# Patient Record
Sex: MALE | Race: BLACK OR AFRICAN AMERICAN | Employment: OTHER | URBAN - METROPOLITAN AREA
[De-identification: names, ages, dates, MRNs, and addresses within clinical notes are randomized per-mention and may not be internally consistent; named-entity substitution may affect disease eponyms.]

---

## 2022-09-09 ENCOUNTER — HOSPITAL ENCOUNTER (EMERGENCY)
Age: 72
Discharge: HOME | End: 2022-09-09
Attending: EMERGENCY MEDICINE
Payer: MEDICARE

## 2022-09-09 ENCOUNTER — APPOINTMENT (OUTPATIENT)
Dept: GENERAL RADIOLOGY | Age: 72
End: 2022-09-09
Attending: EMERGENCY MEDICINE
Payer: MEDICARE

## 2022-09-09 ENCOUNTER — APPOINTMENT (OUTPATIENT)
Dept: CT IMAGING | Age: 72
End: 2022-09-09
Attending: EMERGENCY MEDICINE
Payer: MEDICARE

## 2022-09-09 VITALS
SYSTOLIC BLOOD PRESSURE: 154 MMHG | HEIGHT: 74 IN | WEIGHT: 215 LBS | DIASTOLIC BLOOD PRESSURE: 87 MMHG | BODY MASS INDEX: 27.59 KG/M2 | TEMPERATURE: 98.5 F | HEART RATE: 91 BPM | OXYGEN SATURATION: 95 % | RESPIRATION RATE: 29 BRPM

## 2022-09-09 DIAGNOSIS — J18.9 COMMUNITY ACQUIRED PNEUMONIA OF RIGHT LOWER LOBE OF LUNG: ICD-10-CM

## 2022-09-09 DIAGNOSIS — M10.062 ACUTE IDIOPATHIC GOUT OF LEFT KNEE: Primary | ICD-10-CM

## 2022-09-09 LAB
ALBUMIN SERPL-MCNC: 2.9 G/DL (ref 3.5–5)
ALBUMIN/GLOB SERPL: 0.5 {RATIO} (ref 1.1–2.2)
ALP SERPL-CCNC: 97 U/L (ref 45–117)
ALT SERPL-CCNC: 36 U/L (ref 12–78)
ANION GAP SERPL CALC-SCNC: 9 MMOL/L (ref 5–15)
AST SERPL-CCNC: 34 U/L (ref 15–37)
ATRIAL RATE: 95 BPM
BASOPHILS # BLD: 0 K/UL (ref 0–0.1)
BASOPHILS NFR BLD: 0 % (ref 0–1)
BILIRUB SERPL-MCNC: 0.4 MG/DL (ref 0.2–1)
BNP SERPL-MCNC: 154 PG/ML (ref 0–125)
BUN SERPL-MCNC: 17 MG/DL (ref 6–20)
BUN/CREAT SERPL: 14 (ref 12–20)
CALCIUM SERPL-MCNC: 9.5 MG/DL (ref 8.5–10.1)
CALCULATED P AXIS, ECG09: 28 DEGREES
CALCULATED R AXIS, ECG10: -31 DEGREES
CALCULATED T AXIS, ECG11: 37 DEGREES
CHLORIDE SERPL-SCNC: 98 MMOL/L (ref 97–108)
CO2 SERPL-SCNC: 27 MMOL/L (ref 21–32)
CREAT SERPL-MCNC: 1.23 MG/DL (ref 0.7–1.3)
CRP SERPL-MCNC: 12.79 MG/DL (ref 0–0.6)
DIAGNOSIS, 93000: NORMAL
DIFFERENTIAL METHOD BLD: NORMAL
EOSINOPHIL # BLD: 0.1 K/UL (ref 0–0.4)
EOSINOPHIL NFR BLD: 1 % (ref 0–7)
ERYTHROCYTE [DISTWIDTH] IN BLOOD BY AUTOMATED COUNT: 12.3 % (ref 11.5–14.5)
ERYTHROCYTE [SEDIMENTATION RATE] IN BLOOD: 76 MM/HR (ref 0–20)
GLOBULIN SER CALC-MCNC: 5.9 G/DL (ref 2–4)
GLUCOSE SERPL-MCNC: 111 MG/DL (ref 65–100)
HCT VFR BLD AUTO: 40.6 % (ref 36.6–50.3)
HGB BLD-MCNC: 13.3 G/DL (ref 12.1–17)
IMM GRANULOCYTES # BLD AUTO: 0 K/UL (ref 0–0.04)
IMM GRANULOCYTES NFR BLD AUTO: 0 % (ref 0–0.5)
LYMPHOCYTES # BLD: 1.5 K/UL (ref 0.8–3.5)
LYMPHOCYTES NFR BLD: 17 % (ref 12–49)
MCH RBC QN AUTO: 28.1 PG (ref 26–34)
MCHC RBC AUTO-ENTMCNC: 32.8 G/DL (ref 30–36.5)
MCV RBC AUTO: 85.8 FL (ref 80–99)
MONOCYTES # BLD: 0.8 K/UL (ref 0–1)
MONOCYTES NFR BLD: 9 % (ref 5–13)
NEUTS SEG # BLD: 6.3 K/UL (ref 1.8–8)
NEUTS SEG NFR BLD: 72 % (ref 32–75)
NRBC # BLD: 0 K/UL (ref 0–0.01)
NRBC BLD-RTO: 0 PER 100 WBC
P-R INTERVAL, ECG05: 150 MS
PLATELET # BLD AUTO: 259 K/UL (ref 150–400)
PMV BLD AUTO: 10.1 FL (ref 8.9–12.9)
POTASSIUM SERPL-SCNC: 4.3 MMOL/L (ref 3.5–5.1)
PROT SERPL-MCNC: 8.8 G/DL (ref 6.4–8.2)
Q-T INTERVAL, ECG07: 350 MS
QRS DURATION, ECG06: 110 MS
QTC CALCULATION (BEZET), ECG08: 439 MS
RBC # BLD AUTO: 4.73 M/UL (ref 4.1–5.7)
SODIUM SERPL-SCNC: 134 MMOL/L (ref 136–145)
TROPONIN-HIGH SENSITIVITY: 6 NG/L (ref 0–76)
VENTRICULAR RATE, ECG03: 95 BPM
WBC # BLD AUTO: 8.8 K/UL (ref 4.1–11.1)

## 2022-09-09 PROCEDURE — 99285 EMERGENCY DEPT VISIT HI MDM: CPT

## 2022-09-09 PROCEDURE — 85652 RBC SED RATE AUTOMATED: CPT

## 2022-09-09 PROCEDURE — 80053 COMPREHEN METABOLIC PANEL: CPT

## 2022-09-09 PROCEDURE — 86140 C-REACTIVE PROTEIN: CPT

## 2022-09-09 PROCEDURE — 71046 X-RAY EXAM CHEST 2 VIEWS: CPT

## 2022-09-09 PROCEDURE — 93005 ELECTROCARDIOGRAM TRACING: CPT

## 2022-09-09 PROCEDURE — 36415 COLL VENOUS BLD VENIPUNCTURE: CPT

## 2022-09-09 PROCEDURE — 74011000636 HC RX REV CODE- 636: Performed by: EMERGENCY MEDICINE

## 2022-09-09 PROCEDURE — 74011250637 HC RX REV CODE- 250/637: Performed by: EMERGENCY MEDICINE

## 2022-09-09 PROCEDURE — 83880 ASSAY OF NATRIURETIC PEPTIDE: CPT

## 2022-09-09 PROCEDURE — 84484 ASSAY OF TROPONIN QUANT: CPT

## 2022-09-09 PROCEDURE — 85025 COMPLETE CBC W/AUTO DIFF WBC: CPT

## 2022-09-09 PROCEDURE — 71275 CT ANGIOGRAPHY CHEST: CPT

## 2022-09-09 RX ORDER — AMLODIPINE BESYLATE 10 MG/1
10 TABLET ORAL DAILY
COMMUNITY

## 2022-09-09 RX ORDER — COLCHICINE 0.6 MG/1
0.6 TABLET ORAL
Status: COMPLETED | OUTPATIENT
Start: 2022-09-09 | End: 2022-09-09

## 2022-09-09 RX ORDER — PROBENECID AND COLCHICINE 500; .5 MG/1; MG/1
1 TABLET ORAL DAILY
Qty: 10 TABLET | Refills: 0 | Status: SHIPPED | OUTPATIENT
Start: 2022-09-09

## 2022-09-09 RX ORDER — DOXYCYCLINE 100 MG/1
100 CAPSULE ORAL 2 TIMES DAILY
COMMUNITY

## 2022-09-09 RX ORDER — AMOXICILLIN AND CLAVULANATE POTASSIUM 875; 125 MG/1; MG/1
1 TABLET, FILM COATED ORAL DAILY
COMMUNITY

## 2022-09-09 RX ORDER — PROBENECID AND COLCHICINE 500; .5 MG/1; MG/1
1 TABLET ORAL DAILY
Qty: 10 TABLET | Refills: 0 | Status: SHIPPED | OUTPATIENT
Start: 2022-09-09 | End: 2022-09-09

## 2022-09-09 RX ORDER — OMEPRAZOLE 20 MG/1
20 CAPSULE, DELAYED RELEASE ORAL DAILY
COMMUNITY

## 2022-09-09 RX ADMIN — COLCHICINE 0.6 MG: 0.6 TABLET, FILM COATED ORAL at 19:13

## 2022-09-09 RX ADMIN — IOPAMIDOL 100 ML: 755 INJECTION, SOLUTION INTRAVENOUS at 18:43

## 2022-09-09 NOTE — ED TRIAGE NOTES
Pt assisted to treatment area he states that for the past week he has been having some right lower chest pain that he was seen for on Wednesday at [facility] ER. He also c/o left knee pain with swelling that he states is his gout. He had a chest and knee xray done on Wednesday was told he had pneumonia and nothing about his knee. He denies any SOB with this pain.

## 2022-09-09 NOTE — ED PROVIDER NOTES
The history is provided by the patient. Chest Pain (Angina)   This is a new problem. The current episode started more than 2 days ago. The problem has not changed since onset. Duration of episode(s) is 6 days. The problem occurs constantly. The pain is associated with normal activity. The pain is present in the right side (lower chest). The pain is mild. The pain does not radiate. The symptoms are aggravated by deep breathing. Associated symptoms include malaise/fatigue and shortness of breath (with activity). Pertinent negatives include no abdominal pain, no cough, no fever, no lower extremity edema and no sputum production. Treatments tried: PPI and augmentin and doxycycline. The treatment provided no relief. Risk factors include no risk factors. Knee Pain   This is a new problem. The current episode started more than 2 days ago. The problem occurs constantly. The problem has not changed since onset. The pain is present in the left knee. The quality of the pain is described as aching. The pain is moderate. Associated symptoms include stiffness. Exacerbated by: being off of allopurinol for a month bbecause he ran out of meds and was waiting for delivery. He has tried nothing for the symptoms. There has been no history of extremity trauma. Family history is significant for Gout. Past Medical History:   Diagnosis Date    Gout     Hypertension     Pneumonia        History reviewed. No pertinent surgical history. History reviewed. No pertinent family history.     Social History     Socioeconomic History    Marital status:      Spouse name: Not on file    Number of children: Not on file    Years of education: Not on file    Highest education level: Not on file   Occupational History    Not on file   Tobacco Use    Smoking status: Former     Types: Cigarettes    Smokeless tobacco: Never   Substance and Sexual Activity    Alcohol use: Yes     Comment: rarely    Drug use: Never    Sexual activity: Not on file   Other Topics Concern    Not on file   Social History Narrative    Not on file     Social Determinants of Health     Financial Resource Strain: Not on file   Food Insecurity: Not on file   Transportation Needs: Not on file   Physical Activity: Not on file   Stress: Not on file   Social Connections: Not on file   Intimate Partner Violence: Not on file   Housing Stability: Not on file         ALLERGIES: Patient has no known allergies. Review of Systems   Constitutional:  Positive for malaise/fatigue. Negative for fever. Respiratory:  Positive for shortness of breath (with activity). Negative for cough and sputum production. Cardiovascular:  Positive for chest pain. Gastrointestinal:  Negative for abdominal pain. Musculoskeletal:  Positive for stiffness. All other systems reviewed and are negative. Vitals:    09/09/22 1638 09/09/22 1711 09/09/22 1732 09/09/22 1759   BP: (!) 155/98 (!) 152/85 (!) 159/89 (!) 154/87   Pulse: 90 87 85 91   Resp: 30 (!) 33 27 29   Temp:       SpO2: 97% 96% 96% 95%   Weight:       Height:                Physical Exam  Vitals and nursing note reviewed. Constitutional:       General: He is not in acute distress. Appearance: He is well-developed. HENT:      Head: Normocephalic and atraumatic. Eyes:      Conjunctiva/sclera: Conjunctivae normal.   Neck:      Trachea: No tracheal deviation. Cardiovascular:      Rate and Rhythm: Normal rate and regular rhythm. Pulmonary:      Effort: Pulmonary effort is normal. Tachypnea present. No respiratory distress. Breath sounds: Examination of the right-lower field reveals rhonchi and rales. Rhonchi and rales present. No decreased breath sounds or wheezing. Abdominal:      General: There is no distension. Musculoskeletal:         General: No deformity. Normal range of motion. Cervical back: Neck supple. Left knee: Effusion present. No deformity or erythema. Tenderness present.  No ACL laxity or PCL laxity. Skin:     General: Skin is warm and dry. Neurological:      Mental Status: He is alert. Cranial Nerves: No cranial nerve deficit. Psychiatric:         Behavior: Behavior normal.        [facility]    ED Course as of 09/09/22 1912   Fri Sep 09, 2022   1829 EKG 1613: Rate 95, normal sinus rhythm, left axis deviation. LVH by voltage. No ST segment or T wave abnormalities. No previous tracings available for review. [DK]      ED Course User Index  [DK] Jonathan Bush MD     53-year-old male presents with ongoing right lower chest pain and feeling short of breath last week. He was seen at an outside emergency department in [facility] and was told he had reflux and started on a PPI. When he returned for subsequent evaluation he had CT of his chest performed which showed a possible pneumonia. I do not have any of these records available for review. He was started on Augmentin and doxycycline at the time of his last visit 3 days ago but has not improved so was brought here by family for second opinion. Chest x-ray is equivocal for pneumonia. He does have inflammatory markers that are elevated but likely has a gout flare in his left knee which is typical for him which may be the cause of this or could be secondary to infection. He is not hypoxemic. He does not have a white blood cell count elevation, sustained tachycardia, or fever to qualify for sepsis but he is taking shallow breaths which are likely secondary to the pain he is experiencing. Provided incentive spirometry to help with recruiting lung tissue. Given his unexplained tachypnea and pleuritic chest pain he warrants evaluation for PE with CTA which will further elucidate if he has airspace disease or not. Plan for empiric treatment of what appears to be uncomplicated left knee gout flare.   I do not feel he will need evaluation for septic joint and we discussed risks versus benefit of arthrocentesis which I do not feel is warranted at this time given his significant gout history and typical symptoms. Procedures    7:12 PM  Change of shift. Care of patient signed over to Dr Raghav Melendez. Bedside handoff complete. Awaiting CT results, plan to dispo per results and treat with colchicine for gout.

## 2023-01-30 NOTE — ED NOTES
Bedside and Verbal shift change report given to [address] [place] [address],[address]  (oncoming nurse) by Graciela Cheatham (offgoing nurse). Report included the following information ED Summary and Recent Results.
Progress Note:   Pt has been reexamined by Reginaldo Anderson MD. Pt is feeling much better. Symptoms have improved. All available results have been reviewed with pt and any available family. Pt understands sx, dx, and tx in ED. Care plan has been outlined and questions have been answered. Pt is ready to go home. Will send home on Gout and Pneumonia instructions. Outpatient referral with PCP as needed. Written by Reginaldo Anderson MD,8:12 PM    .   .
Pt ambulates to bathroom and back to room with use of one crutch.   Then pt given water per Dr Carmelita Garduno pt is able to drink
The patient was discharged home by Dr Maria C Hall and Fernando Ash RN in stable condition, accompanied by family. The patient is alert and oriented, is in no respiratory distress and has vital signs within normal limits . The patient's diagnosis, condition and treatment were explained to patient or parent/guardian. The patient/responsible party expressed understanding. One prescription given to patient. Work/school note given to pt. A discharge plan has been developed. A  was not involved in the process. Aftercare instructions were given to the patient. Pt to be transported home by family.
(2) well flexed

## 2023-05-20 RX ORDER — PROBENECID AND COLCHICINE 500; .5 MG/1; MG/1
1 TABLET ORAL DAILY
COMMUNITY
Start: 2022-09-09

## 2023-05-20 RX ORDER — AMOXICILLIN AND CLAVULANATE POTASSIUM 875; 125 MG/1; MG/1
1 TABLET, FILM COATED ORAL DAILY
COMMUNITY

## 2023-05-20 RX ORDER — AMLODIPINE BESYLATE 10 MG/1
10 TABLET ORAL DAILY
COMMUNITY

## 2023-05-20 RX ORDER — DOXYCYCLINE 100 MG/1
100 CAPSULE ORAL 2 TIMES DAILY
COMMUNITY

## 2023-05-20 RX ORDER — OMEPRAZOLE 20 MG/1
20 CAPSULE, DELAYED RELEASE ORAL DAILY
COMMUNITY